# Patient Record
(demographics unavailable — no encounter records)

---

## 2024-12-09 NOTE — HISTORY OF PRESENT ILLNESS
[FreeTextEntry1] : 73 year old woman with a history of HTN, HLD, GERD, hypothyroidism, JANE, AF on AC, nonobstructive CAD.   She was diagnosed with breast ca. She is now s/p double mastectomy with reconstruction at [facility] on 6/13/18. Post operatively she was noted to have hypotensive and her HCTZ stopped.  she is now planning on having breast reconstructive surgery and umbical hernia repair on 3/29/19. she is status post R GSV RFA and sclero.  She is here for routine followup visit.  Since her last visit her dementia has been worsening. Otherwise she has been doing. She has been active at home especially with decorating the house.  Her arm pain has improved. Her palpitations have improved. She   denies any chest pain, PND, orthopnea, lower extremity edema, near syncope, syncope, strokelike symptoms.  She is compliant with her medications and report no acute cardiac distress.

## 2024-12-09 NOTE — PHYSICAL EXAM
[Normal] : alert and oriented, normal memory [de-identified] : lymphedema [Normal Rate] : normal [Irregularly Irregular] : irregularly irregular [Normal S1] : normal S1 [Normal S2] : normal S2 [No Gallop] : no gallop heard [Distant] : the heart sounds were distant [No Murmur] : no murmurs heard [Right Carotid Bruit] : no bruit heard over the right carotid [Left Carotid Bruit] : no bruit heard over the left carotid [2+] : left 2+ [Bruit] : no bruit heard [No Pitting Edema] : no pitting edema present [Rt] : varicose veins of the right leg noted [Lt] : varicose veins of the left leg noted

## 2024-12-09 NOTE — CARDIOLOGY SUMMARY
[de-identified] : Atrial fibrillation  Low voltage in precordial leads. Nonspecific ST depression -Nondiagnostic. [de-identified] : 3/16/22 AF with controlled. 4 WCT likely AF with aberrancy ( longest 13 beats) [de-identified] : 5/2022 - Nonspecific ST-T wave abnormality  [de-identified] : 12/2018 normal LV function. \par  2/2022 Normal LV function.  [de-identified] : 5/2017 LM:   --  LM: Normal. LAD:   --  LAD: Angiography showed minor luminal irregularities with no flow limiting lesions. CX:   --  Ostial circumflex: There was a 20 % stenosis. RCA:   --  Mid RCA: There was a 40 % stenosis.

## 2024-12-09 NOTE — DISCUSSION/SUMMARY
[FreeTextEntry1] : 73 year woman with a history as listed presents for a followup visit.   Vickie is doing well. overall. She denies any anginal symptoms. Clinically she is euvolemic on exam. Her EKG is unchanged from previous.   Her palpitations have improved. Her EKG shows rate controlled AFib.  On her last Zio she had 13 beats of WCT likely AF with aberrancy though cannot completely r/o NSVT.   She had nonobstructive CAD on a coronary angiogram in 5/2017. Her EKG did not reveal any significant ischemic changes today.  Her nuclear stress in 5/2022 was unrevealing. Defer any further testing.   Her LFTs improved after switching off of the Eliquis to the Xarelto. She will continue with Xarelto for CVA risk reduction.    Her blood pressure is controlled.  She was encouraged again to maintain a BP log at home.  She will continue  losartan 100mg Qday,  HCTZ 25mg QOD,  Metoprolol 100mg q12. She will continue Norvasc 10mg Qday. Her BP is now lower than previous. If her BP drops more will dc HCTZ.    She will continue with her current dose of Zocor. Her last lipid panel was at goal. She will continue to monitor diet and exercise.   She reattempted CPA for JANE but states she lost more sleep with it on. She did not followup with anyone else.   She will continue to followup with pain management for her upper extremity lymphedema.  FU with neuro for her memory.  Exercise and diet counseling was performed in order to reduce her future cardiovascular risk.  She will followup with me in 6 months months or sooner if necessary.  [EKG obtained to assist in diagnosis and management of assessed problem(s)] : EKG obtained to assist in diagnosis and management of assessed problem(s)

## 2024-12-09 NOTE — REVIEW OF SYSTEMS
[Palpitations] : no palpitations [Numbness (Hypoesthesia)] : numbness [Memory Lapses Or Loss] : memory lapses or loss [Negative] : Heme/Lymph [FreeTextEntry9] : left arm pain

## 2024-12-16 NOTE — ASSESSMENT
[FreeTextEntry1] : hypothyroidism: Check TSH. Continue levothyroxine 100mcg daily.  HLD: Check lipids. Continue simvastatin 20mg daily.  CVS: She follows with Dr Franks. BP, HR stable.  Lymphedema: Pain controlled. Discussed decreasing duloxetine to 30mg daily.

## 2024-12-16 NOTE — PHYSICAL EXAM
[No Acute Distress] : no acute distress [Normal Sclera/Conjunctiva] : normal sclera/conjunctiva [PERRL] : pupils equal round and reactive to light [EOMI] : extraocular movements intact [Normal Oropharynx] : the oropharynx was normal [No Lymphadenopathy] : no lymphadenopathy [Supple] : supple [No Respiratory Distress] : no respiratory distress  [No Accessory Muscle Use] : no accessory muscle use [Clear to Auscultation] : lungs were clear to auscultation bilaterally [Normal Rate] : normal rate  [Normal S1, S2] : normal S1 and S2 [No Edema] : there was no peripheral edema [Soft] : abdomen soft [Non Tender] : non-tender [Non-distended] : non-distended [Normal Bowel Sounds] : normal bowel sounds [Normal Posterior Cervical Nodes] : no posterior cervical lymphadenopathy [Normal Anterior Cervical Nodes] : no anterior cervical lymphadenopathy

## 2024-12-16 NOTE — HISTORY OF PRESENT ILLNESS
[de-identified] : 73F with hx below presents for follow-up of hypothyroidism and HLD.  Due for bloodwork.

## 2025-02-22 NOTE — ASSESSMENT
[FreeTextEntry1] : # Neoplasm of uncertain behavior Location: R frontal scalp DDx: SCC/AK vs VV vs SK Biopsy by shave The risks/benefits/alternatives of skin biopsy were explained to the patient, which include and are not limited to bleeding, infection, scarring or discoloration of skin, and recurrence of lesion. Patient expressed understanding of these risks and provided consent to the procedure. Time out with verification of patient and lesion site was performed. Site was prepped with rubbing alcohol, lidocaine with epinephrine was injected for anesthesia, and biopsy was performed. Specimen sent to path.  Procedure was without complication and well tolerated. Wound care was discussed.  #SKs on face -Benign, reassurance

## 2025-02-22 NOTE — HISTORY OF PRESENT ILLNESS
[FreeTextEntry1] : NP bump [de-identified] : NP (last seen over 3 years prior) Here with  Here for eval of bump on scalp Looks different than other spots on face

## 2025-02-22 NOTE — PHYSICAL EXAM
[FreeTextEntry3] : Hyperkeratotic papule R frontal scalp Several stuck on papules and macules on face

## 2025-02-27 NOTE — HISTORY OF PRESENT ILLNESS
[Disease: _____________________] : Disease: [unfilled] [T: ___] : T[unfilled] [N: ___] : N[unfilled] [M: ___] : M[unfilled] [AJCC Stage: ____] : AJCC Stage: [unfilled] [de-identified] : Left breast cancer at age 67 04/11/18 Bilateral breast mammography and ultrasound showed a 9 mm irregular nodule in the left 9 o'clock axis 8 cm FN for which a sonoguided core biopsy was recommended. Heterogeneous calcifications in the UOQ of the left breast on mammogram for which stereotactic guided biopsy is recommended 04/19/18 Left breast sonoguided core biopsy at 9:00 showed a 4 mm IDC SBR 6/9 ER/[place] 90% and HER-2 negative. DCIS solid pattern with intermediate grade nuclear atypia       Left breast stereotactic guided core biopsy in the upper central left breast showed ADH, proliferative fibrocystic changes with microcalcifications 04/26/18 MRI of the breast showed enhancement at site of known biopsy proven carcinoma in the medial left breast. Enhancement at site of atypia in the central left breast for which surgical excision is recommended. Non-mass enhancement in the outer posterior left breast and outer right breast 05/02/18 MRI guided core biopsy of the left breast showed a 1.0 cm DCIS, papillary, micropapillary and solid patterns with intermediate grade nuclear atypia. Microcalcifications                   present in DCIS, ER/%       MRI guided core biopsy of the right breast showed mostly fatty tissue with rare clusters of detached papillary tissue fragments- likely contamination 06/13/18 Left mastectomy showed a 1.0 cm IDC, SBR 7/9 with 2/4 LN involved, largest metastatic deposit 0.8 cm with no extranodal extension, 25 negative axillary LN, and a 1.9               cm focus of DCIS, solid, cribriform, papillary pattern, intermediate nuclear grade with focal necrosis.     Right mastectomy showed intraductal papilloma. Immediate reconstruction with LUISITO flap by Dr. Mccall. 7/18 MammaPrint Index -0.266 which is in the high risk range. 9/18-11/18 Taxotere and Cytoxan x 4 cycles with Onpro for bone marrow support. 10/2018 Gene Dx 53 cancer gene panel negative for mutations. 11/20/18 Signed consent form to participate in the BWEL trial for weight loss. 11/30/18 - 6/19 Anastrozole discontinued due to arthralgias 6/19 Letrozole started  [de-identified] : 1.0 cm IDC, SBR 7/9, 2/29 LN+, largest metastatic deposit of 0.8 cm, no extranodal extension, ER/HI 90%, HER-2 neg [de-identified] : Vickie started letrozole in 6/19 and continues to tolerate it well, experiencing only minimal arthralgias, after being on anastrozole, which she took from 11/18 to 6/19 and was discontinued due to arthralgias. Hot flashes have become a very rare occurrence for her now.  Her memory has gradually worsened since undergoing chemotherapy. She is under the care of a neurologist and is taking donepezil and memantine.  The lymphedema and pain in the left axilla are now under control. She wears a lymphedema sleeve as needed. Her duloxetine dose has gradually been tapered.  The neuropathy in the hands and feet is intermittent but does not really limit her activities. Enrolled in the BWEL trial for weight loss and was randomized to work with a  and has a Fitbit.  Weight is within her usual range.  HEALTH MAINTENANCE: Mammogram: s/p bilateral mastectomies Pap smear: 7/23/20 negative Bone density DEXA scan: 8/2/24 showed T scores of 1.0 in spine, -0.9 in femoral neck and -0.4 in total hip                                          8/3/22 showed T scores of 0.8 in spine, -0.4 in femoral neck and 0.3 in total hip                                           5/18/20 showed T scores of 0.9 in spine, -0.3 in femoral neck and 0.3 in the hip                                           4/11/18 showed T scores of 2.5 in spine, -0.8 in femoral neck and 1.1 in the hip  Colonoscopy: 2017 negative but previously colonoscopies she had multiple polyps >30 polyps. 2020 negative and had another one in 9/2022 with one small benign polyp Genetics: Sister with colon cancer at age 38 and another sister had breast cancer at age 51; 10/2018 Gene Dx 53 cancer gene panel negative for mutations

## 2025-02-27 NOTE — PHYSICAL EXAM
[Restricted in physically strenuous activity but ambulatory and able to carry out work of a light or sedentary nature] : Status 1- Restricted in physically strenuous activity but ambulatory and able to carry out work of a light or sedentary nature, e.g., light house work, office work [Normal] : affect appropriate [de-identified] : s/p bilateral mastectomies with reconstruction. No chest wall masses or lymphadenopathy

## 2025-04-28 NOTE — ASSESSMENT
[FreeTextEntry1] : Neuro: She is following with Dr Lundy. On donepezil, memantine.  CVS: BP, HR stable. She follows with Dr Franks.  Endo: Check TFTs. On levothyroxine.  Psych: Reports improved mood. Discussed tapering duloxetine. Start 20mg and then stop in 1 month.  HCM: Colonoscopy due Sept. Up to date on shingrix, prevnar. Check labs.   [Vaccines Reviewed] : Immunizations reviewed today. Please see immunization details in the vaccine log within the immunization flowsheet.

## 2025-04-28 NOTE — HEALTH RISK ASSESSMENT
[Good] : ~his/her~  mood as  good [No] : In the past 12 months have you used drugs other than those required for medical reasons? No [No falls in past year] : Patient reported no falls in the past year [Not at All (0)] : 9.) Thoughts that you would be off dead or of hurting yourself in some way? Not at all [PHQ-9 Negative - No further assessment needed] : PHQ-9 Negative - No further assessment needed [I have developed a follow-up plan documented below in the note.] : I have developed a follow-up plan documented below in the note. [facility]] : 5 [[id number]] : 0 [Former] : Former [20 or more] : 20 or more [> 15 Years] : > 15 Years [Change in mental status noted] : Change in mental status noted [Behavior] : difficulty with behavior [Learning/Retaining New Information] : difficulty learning/retaining new information [Handling Complex Tasks] : difficulty handling complex tasks [With Family] : lives with family [Fully functional (bathing, dressing, toileting, transferring, walking, feeding)] : Fully functional (bathing, dressing, toileting, transferring, walking, feeding) [Fully functional (using the telephone, shopping, preparing meals, housekeeping, doing laundry, using] : Fully functional and needs no help or supervision to perform IADLs (using the telephone, shopping, preparing meals, housekeeping, doing laundry, using transportation, managing medications and managing finances) [Reports changes in hearing] : Reports no changes in hearing [Reports changes in vision] : Reports no changes in vision [Reports normal functional visual acuity (ie: able to read med bottle)] : Reports normal functional visual acuity [Smoke Detector] : smoke detector [Carbon Monoxide Detector] : carbon monoxide detector [Seat Belt] :  uses seat belt [With Patient/Caregiver] : , with patient/caregiver [Name: ___] : Health Care Proxy's Name: [unfilled]  [Relationship: ___] : Relationship: [unfilled] [AdvancecareDate] : 04/25

## 2025-06-09 NOTE — HISTORY OF PRESENT ILLNESS
[FreeTextEntry1] : 74 year old woman with a history of HTN, HLD, GERD, hypothyroidism, JANE, AF on AC, nonobstructive CAD.   She was diagnosed with breast ca. She is now s/p double mastectomy with reconstruction at [facility] on 6/13/18. Post operatively she was noted to have hypotensive and her HCTZ stopped.  she is now planning on having breast reconstructive surgery and umbical hernia repair on 3/29/19. she is status post R GSV RFA and sclero.  She is here for routine followup visit.  Since her last visit her dementia has been worsening. Otherwise she has been doing. She has been active at home with household chores and walking the dog.  Her arm pain has improved. Her palpitations have improved. She   denies any chest pain, PND, orthopnea, lower extremity edema, near syncope, syncope, strokelike symptoms.  She is compliant with her medications and report no acute cardiac distress.

## 2025-06-09 NOTE — DISCUSSION/SUMMARY
[EKG obtained to assist in diagnosis and management of assessed problem(s)] : EKG obtained to assist in diagnosis and management of assessed problem(s) [FreeTextEntry1] : 73 year woman with a history as listed presents for a followup visit.   Vickie is doing well. overall. She denies any anginal symptoms. Clinically she is euvolemic on exam. Her EKG is unchanged from previous.   Her palpitations have improved. Her EKG shows rate controlled AFib.  On her last Zio she had 13 beats of WCT likely AF with aberrancy though cannot completely r/o NSVT.   She had nonobstructive CAD on a coronary angiogram in 5/2017. Her EKG did not reveal any significant ischemic changes today.  Her nuclear stress in 5/2022 was unrevealing. Defer any further testing.   Her LFTs improved after switching off of the Eliquis to the Xarelto. She will continue with Xarelto for CVA risk reduction.    Her blood pressure is controlled.  She was encouraged again to maintain a BP log at home.  She will continue  losartan 100mg Qday,  HCTZ 25mg QOD,  Metoprolol 100mg q12. She will continue Norvasc 10mg Qday. Her BP is now lower than previous. If her BP drops more will dc HCTZ.    Her ldl is high. I would change her zocor to crestor 20mg HS. repeat cmp and lipids in 3-4 months   She reattempted CPA for JANE but states she lost more sleep with it on. She did not followup with anyone else.   She will continue to followup with pain management for her upper extremity lymphedema.  FU with neuro for her memory.  Exercise and diet counseling was performed in order to reduce her future cardiovascular risk.  She will followup with me in 6 months or sooner if necessary.

## 2025-06-09 NOTE — PHYSICAL EXAM
[Normal] : alert and oriented, normal memory [de-identified] : lymphedema [Normal Rate] : normal [Irregularly Irregular] : irregularly irregular [Normal S1] : normal S1 [No Gallop] : no gallop heard [Normal S2] : normal S2 [Distant] : the heart sounds were distant [No Murmur] : no murmurs heard [Left Carotid Bruit] : no bruit heard over the left carotid [Right Carotid Bruit] : no bruit heard over the right carotid [2+] : left 2+ [Bruit] : no bruit heard [No Pitting Edema] : no pitting edema present [Rt] : varicose veins of the right leg noted [Lt] : varicose veins of the left leg noted

## 2025-06-09 NOTE — CARDIOLOGY SUMMARY
[de-identified] : Atrial fibrillation  Low voltage in precordial leads. Nonspecific ST depression -Nondiagnostic. [de-identified] : 3/16/22 AF with controlled. 4 WCT likely AF with aberrancy ( longest 13 beats) [de-identified] : 5/2022 - Nonspecific ST-T wave abnormality  [de-identified] : 12/2018 normal LV function. \par  2/2022 Normal LV function.  [de-identified] : 5/2017 LM:   --  LM: Normal. LAD:   --  LAD: Angiography showed minor luminal irregularities with no flow limiting lesions. CX:   --  Ostial circumflex: There was a 20 % stenosis. RCA:   --  Mid RCA: There was a 40 % stenosis.